# Patient Record
Sex: FEMALE | Race: WHITE | Employment: FULL TIME | URBAN - METROPOLITAN AREA
[De-identification: names, ages, dates, MRNs, and addresses within clinical notes are randomized per-mention and may not be internally consistent; named-entity substitution may affect disease eponyms.]

---

## 2020-10-21 ENCOUNTER — OFFICE VISIT (OUTPATIENT)
Dept: INTERNAL MEDICINE CLINIC | Age: 25
End: 2020-10-21
Payer: COMMERCIAL

## 2020-10-21 VITALS
SYSTOLIC BLOOD PRESSURE: 110 MMHG | BODY MASS INDEX: 18.22 KG/M2 | OXYGEN SATURATION: 99 % | TEMPERATURE: 97.8 F | HEART RATE: 68 BPM | HEIGHT: 69 IN | RESPIRATION RATE: 16 BRPM | DIASTOLIC BLOOD PRESSURE: 78 MMHG | WEIGHT: 123 LBS

## 2020-10-21 DIAGNOSIS — R41.840 CONCENTRATION DEFICIT: Primary | ICD-10-CM

## 2020-10-21 DIAGNOSIS — Z23 NEED FOR HPV VACCINATION: ICD-10-CM

## 2020-10-21 PROCEDURE — 99202 OFFICE O/P NEW SF 15 MIN: CPT | Performed by: NURSE PRACTITIONER

## 2020-10-21 RX ORDER — HUMAN PAPILLOMAVIRUS 9-VALENT VACCINE, RECOMBINANT 30; 40; 60; 40; 20; 20; 20; 20; 20 UG/.5ML; UG/.5ML; UG/.5ML; UG/.5ML; UG/.5ML; UG/.5ML; UG/.5ML; UG/.5ML; UG/.5ML
0.5 INJECTION, SUSPENSION INTRAMUSCULAR ONCE
Qty: 0.5 ML | Refills: 0 | Status: SHIPPED | OUTPATIENT
Start: 2020-10-21 | End: 2020-10-21

## 2020-10-21 RX ORDER — BUPROPION HYDROCHLORIDE 150 MG/1
150 TABLET ORAL
Qty: 30 TAB | Refills: 2 | Status: SHIPPED | OUTPATIENT
Start: 2020-10-21 | End: 2020-12-15

## 2020-12-15 ENCOUNTER — VIRTUAL VISIT (OUTPATIENT)
Dept: INTERNAL MEDICINE CLINIC | Age: 25
End: 2020-12-15
Payer: COMMERCIAL

## 2020-12-15 DIAGNOSIS — F90.9 ATTENTION DEFICIT HYPERACTIVITY DISORDER (ADHD), UNSPECIFIED ADHD TYPE: Primary | ICD-10-CM

## 2020-12-15 PROCEDURE — 99213 OFFICE O/P EST LOW 20 MIN: CPT | Performed by: NURSE PRACTITIONER

## 2020-12-15 RX ORDER — DEXTROAMPHETAMINE SACCHARATE, AMPHETAMINE ASPARTATE, DEXTROAMPHETAMINE SULFATE AND AMPHETAMINE SULFATE 2.5; 2.5; 2.5; 2.5 MG/1; MG/1; MG/1; MG/1
10 TABLET ORAL DAILY
Qty: 30 TAB | Refills: 0 | Status: SHIPPED | OUTPATIENT
Start: 2020-12-15 | End: 2021-03-17 | Stop reason: ALTCHOICE

## 2020-12-15 NOTE — PATIENT INSTRUCTIONS
Try 1 tab tomorrow and see how you feel. You can take 2 tabs if you don't feel like it is working. You can also try 1/2-1tab in the afternoons if needed. Learning About Stimulant Medicines for Attention Deficit Hyperactivity Disorder (ADHD) How are stimulant medicines used to treat ADHD? Stimulant medicines affect certain chemicals in the brain. They can help a person with ADHD to focus better. And they can make the person less hyperactive and impulsive. ADHD is treated with medicines and behavior therapy. Stimulants are the medicines used most. 
What are some types of these medicines? Stimulant medicines may include: · Dexmethylphenidate (Focalin). · Lisdexamfetamine (Vyvanse). · Methylphenidate (Concerta, Daytrana, Metadate CD, Methylin, Ritalin). · Mixed salts amphetamine (Adderall). How do you take them safely? · Take your medicines exactly as prescribed. Call your doctor if you think you are having a problem with your medicine. You will get more details on the specific medicines your doctor prescribes. · Do not take \"make-up\" doses. If you miss a dose, and if it's not too late in the day, it's okay to take it. But don't double up doses. · Do not misuse your medicine. Some medicines for ADHD can be misused. Some people may take a larger dose than prescribed. They may take them for their non-medical effects. Or they may share or sell them. Misuse can lead to a stimulant use disorder. · Keep close track of your medicine. Don't sell or give medicine to other people. What are the side effects? Common side effects include loss of appetite, a headache, and an upset stomach. You may also have mood changes or sleep problems. Or you may feel nervous. Some stimulant medicines can cause a dry mouth. If these medicines have bothersome side effects or don't work for you, your doctor might prescribe another type of medicine. How long can you expect to use these medicines? Most doctors prescribe a low dose of stimulant medicines at first. Your doctor may have you slowly increase the dose until your symptoms are managed. Or you might get a different medicine or treatment. This can take several weeks. Some doctors may advise taking a break from the medicine over some weekends or during holidays. But this depends on the type of symptoms you have. You may need to take medicine for ADHD for a long time. But your doctor will check now and then to see if you can take a lower dose and still get the benefits of the medicine. If you want to stop or reduce your use of the medicine, talk to your doctor first. Some signs that you may be able to lower or stop your dose include: 
· You have no symptoms for more than a year while you take the medicine. · You are doing better at the same dose. · Your behavior is appropriate even if you miss a dose or two. · You are newly able to concentrate. Follow-up care is a key part of your treatment and safety. Be sure to make and go to all appointments, and call your doctor if you are having problems. It's also a good idea to know your test results and keep a list of the medicines you take. Where can you learn more? Go to http://www.gray.com/ Enter S155 in the search box to learn more about \"Learning About Stimulant Medicines for Attention Deficit Hyperactivity Disorder (ADHD). \" Current as of: January 31, 2020               Content Version: 12.6 © 2120-1882 [employer]. Care instructions adapted under license by [employer] (which disclaims liability or warranty for this information). If you have questions about a medical condition or this instruction, always ask your healthcare professional. [address] any warranty or liability for your use of this information.

## 2020-12-15 NOTE — PROGRESS NOTES
Nick Zazueta is a 26 y.o. female who was seen by synchronous (real-time) audio-video technology on 12/15/2020. Assessment & Plan:   Diagnoses and all orders for this visit:    1. Attention deficit hyperactivity disorder (ADHD), unspecified ADHD type  -     dextroamphetamine-amphetamine (ADDERALL) 10 mg tablet; Take 1 Tab by mouth daily. Max Daily Amount: 10 mg. Follow-up and Dispositions    · Return in about 4 weeks (around 1/12/2021) for ADHD. Subjective:   Nick Zazueta was seen for Follow-up      Mental Health Review  Patient was recently diagnosed with ADHD by Ad[facility]. Records received and reviewed. Discussed treatment and different medications.  reviewed. She never tried the wellbutrin, will take this off her medication list.        Prior to Admission medications    Medication Sig Start Date End Date Taking? Authorizing Provider   INTRAUTERINE DEVICE, IUD, IU by IntraUTERine route. Provider, Historical   buPROPion XL (WELLBUTRIN XL) 150 mg tablet Take 1 Tab by mouth every morning. 10/21/20   Skiff, Emmer Byars, NP       There are no active problems to display for this patient. Current Outpatient Medications   Medication Sig Dispense Refill    dextroamphetamine-amphetamine (ADDERALL) 10 mg tablet Take 1 Tab by mouth daily. Max Daily Amount: 10 mg. 30 Tab 0    INTRAUTERINE DEVICE, IUD, IU by IntraUTERine route.  buPROPion XL (WELLBUTRIN XL) 150 mg tablet Take 1 Tab by mouth every morning. 30 Tab 2     Allergies   Allergen Reactions    Codeine Nausea Only     No past medical history on file. ROS - per HPI      Objective:   No flowsheet data found.   General: alert, cooperative, no distress   Mental  status: normal mood, behavior, speech, dress, motor activity, and thought processes, able to follow commands   Eyes: EOM intact, normal sclera   Mouth: mucous membranes moist   Neck: no visualized mass   Resp: normal effort and no respiratory distress   Neuro: no gross deficits   Musculoskeletal: normal ROM of neck   Skin: no discoloration or lesions of concern on visible areas   Psychiatric: normal affect, no hallucinations             We discussed the expected course, resolution and complications of the diagnosis(es) in detail. Medication risks, benefits, costs, interactions, and alternatives were discussed as indicated. I advised her to contact the office if her condition worsens, changes or fails to improve as anticipated. She expressed understanding with the diagnosis(es) and plan. Jackelin Dean is a 26 y.o. female who was evaluated by a video visit encounter for concerns as above. Patient identification was verified prior to start of the visit. A caregiver was present when appropriate. Due to this being a TeleHealth encounter (During DNFNE-46 public health emergency), evaluation of the following organ systems was limited: Vitals/Constitutional/EENT/Resp/CV/GI//MS/Neuro/Skin/Heme-Lymph-Imm. Pursuant to the emergency declaration under the [facility], 1135 waiver authority and the [employer] Act, this Virtual  Visit was conducted, with patient's (and/or legal guardian's) consent, to reduce the patient's risk of exposure to COVID-19 and provide necessary medical care. Services were provided through a synchronous discussion virtually to substitute for in-person clinic visit. I was at home. The patient was at home.     Candy Dos Santos NP

## 2021-02-26 ENCOUNTER — OFFICE VISIT (OUTPATIENT)
Dept: INTERNAL MEDICINE CLINIC | Age: 26
End: 2021-02-26
Payer: COMMERCIAL

## 2021-02-26 ENCOUNTER — TELEPHONE (OUTPATIENT)
Dept: INTERNAL MEDICINE CLINIC | Age: 26
End: 2021-02-26

## 2021-02-26 VITALS
RESPIRATION RATE: 16 BRPM | SYSTOLIC BLOOD PRESSURE: 115 MMHG | DIASTOLIC BLOOD PRESSURE: 77 MMHG | TEMPERATURE: 98.2 F | BODY MASS INDEX: 19.21 KG/M2 | WEIGHT: 131 LBS | HEART RATE: 91 BPM | OXYGEN SATURATION: 98 %

## 2021-02-26 DIAGNOSIS — M25.562 ACUTE PAIN OF LEFT KNEE: Primary | ICD-10-CM

## 2021-02-26 DIAGNOSIS — S93.492A SPRAIN OF ANTERIOR TALOFIBULAR LIGAMENT OF LEFT ANKLE, INITIAL ENCOUNTER: ICD-10-CM

## 2021-02-26 PROCEDURE — 99212 OFFICE O/P EST SF 10 MIN: CPT | Performed by: NURSE PRACTITIONER

## 2021-02-26 NOTE — PROGRESS NOTES
Verified name and birth date for privacy precautions. Chart reviewed in preparation for today's visit. Chief Complaint   Patient presents with    Knee Injury     left ankle and left knee injury yesterday          Health Maintenance Due   Topic    Hepatitis C Screening     HPV Age 9Y-34Y (1 - 2-dose series)    DTaP/Tdap/Td series (1 - Tdap)    PAP AKA CERVICAL CYTOLOGY     Flu Vaccine (1)         Wt Readings from Last 3 Encounters:   02/26/21 131 lb (59.4 kg)   10/21/20 123 lb (55.8 kg)     Temp Readings from Last 3 Encounters:   02/26/21 98.2 °F (36.8 °C) (Temporal)   10/21/20 97.8 °F (36.6 °C) (Temporal)     BP Readings from Last 3 Encounters:   02/26/21 115/77   10/21/20 110/78     Pulse Readings from Last 3 Encounters:   02/26/21 91   10/21/20 68         Learning Assessment:  :     No flowsheet data found. Depression Screening:  :     3 most recent PHQ Screens 2/26/2021   Little interest or pleasure in doing things Not at all   Feeling down, depressed, irritable, or hopeless Not at all   Total Score PHQ 2 0       Fall Risk Assessment:  :     No flowsheet data found. Abuse Screening:  :     Abuse Screening Questionnaire 2/26/2021   Do you ever feel afraid of your partner? N   Are you in a relationship with someone who physically or mentally threatens you? N   Is it safe for you to go home?  Marialuisa Godwin

## 2021-02-26 NOTE — PROGRESS NOTES
HISTORY OF PRESENT ILLNESS  Shayan Chao is a 26 y.o. female. Patient reports left knee and ankle pain after being hit by a running dog yesterday. She was walking with her dog at the dog park when a border collie ran into her left leg on lateral aspect. Her knee and ankle twisted inward. She experienced mild pain last night making it uncomfortable to sleep. Today, she is experiencing pain with sudden twisting movements of her knee and pain when walking of left ankle. There is swelling of left ankle. She has not taken anything for the pain. She has applied an ace wrap, which has helped slightly. Visit Vitals  /77 (BP 1 Location: Left arm, BP Patient Position: Sitting, BP Cuff Size: Adult)   Pulse 91   Temp 98.2 °F (36.8 °C) (Temporal)   Resp 16   Wt 131 lb (59.4 kg)   LMP 02/17/2021   SpO2 98%   BMI 19.21 kg/m²       HPI    Review of Systems   Musculoskeletal:        Left knee and ankle pain       Physical Exam  Constitutional:       Appearance: Normal appearance. Musculoskeletal:      Left knee: She exhibits normal range of motion, no swelling, no effusion, no ecchymosis and normal patellar mobility. No medial joint line, no lateral joint line, no MCL, no LCL and no patellar tendon tenderness noted. Left ankle: She exhibits swelling. She exhibits normal range of motion. Tenderness. AITFL (and swelling) tenderness found. Neurological:      Mental Status: She is alert. ASSESSMENT and PLAN    ICD-10-CM ICD-9-CM    1. Acute pain of left knee  M25.562 719.46    2. Sprain of anterior talofibular ligament of left ankle, initial encounter  S93.492A 845.09      No orders of the defined types were placed in this encounter.   RICE advised over the next 2-4 weeks  Motrin or aleve for pain and swelling  Consider imaging if no improvement over the next week

## 2021-03-09 DIAGNOSIS — F90.9 ATTENTION DEFICIT HYPERACTIVITY DISORDER (ADHD), UNSPECIFIED ADHD TYPE: ICD-10-CM

## 2021-03-09 RX ORDER — DEXTROAMPHETAMINE SACCHARATE, AMPHETAMINE ASPARTATE, DEXTROAMPHETAMINE SULFATE AND AMPHETAMINE SULFATE 2.5; 2.5; 2.5; 2.5 MG/1; MG/1; MG/1; MG/1
10 TABLET ORAL DAILY
Qty: 30 TAB | Refills: 0 | OUTPATIENT
Start: 2021-03-09

## 2021-03-10 NOTE — TELEPHONE ENCOUNTER
Called patient and scheduled an appointment on Wednesday, 3/17/2021 at 1:15 PM with [facility]

## 2021-03-17 ENCOUNTER — OFFICE VISIT (OUTPATIENT)
Dept: INTERNAL MEDICINE CLINIC | Age: 26
End: 2021-03-17
Payer: COMMERCIAL

## 2021-03-17 VITALS
DIASTOLIC BLOOD PRESSURE: 68 MMHG | BODY MASS INDEX: 19.2 KG/M2 | WEIGHT: 129.6 LBS | RESPIRATION RATE: 16 BRPM | TEMPERATURE: 97.5 F | OXYGEN SATURATION: 99 % | HEIGHT: 69 IN | SYSTOLIC BLOOD PRESSURE: 90 MMHG | HEART RATE: 60 BPM

## 2021-03-17 DIAGNOSIS — F90.9 ATTENTION DEFICIT HYPERACTIVITY DISORDER (ADHD), UNSPECIFIED ADHD TYPE: Primary | ICD-10-CM

## 2021-03-17 PROCEDURE — 99213 OFFICE O/P EST LOW 20 MIN: CPT | Performed by: NURSE PRACTITIONER

## 2021-03-17 RX ORDER — DEXTROAMPHETAMINE SACCHARATE, AMPHETAMINE ASPARTATE MONOHYDRATE, DEXTROAMPHETAMINE SULFATE AND AMPHETAMINE SULFATE 2.5; 2.5; 2.5; 2.5 MG/1; MG/1; MG/1; MG/1
10 CAPSULE, EXTENDED RELEASE ORAL
Qty: 30 CAP | Refills: 0 | Status: SHIPPED | OUTPATIENT
Start: 2021-03-17 | End: 2021-05-12 | Stop reason: SDUPTHER

## 2021-03-17 NOTE — PROGRESS NOTES
Subjective:      Shante Wilson is a 26 y.o. female who presents today for follow up. Mental Health Review  Patient is seen for ADHD. Current treatment regimen includes: Adderall 10mg daily . Medication compliance: she does not take this when not working. Pt reports the following side effects: sensation of feeling \"on\" medication. Discussed starting XR to help with this. [facility]  reviewed. Patient Active Problem List    Diagnosis Date Noted    Attention deficit hyperactivity disorder (ADHD), unspecified ADHD type 03/17/2021     Current Outpatient Medications   Medication Sig Dispense Refill    amphetamine-dextroamphetamine XR (ADDERALL XR) 10 mg XR capsule Take 1 Cap by mouth every morning. Max Daily Amount: 10 mg. 30 Cap 0    INTRAUTERINE DEVICE, IUD, IU by IntraUTERine route. Allergies   Allergen Reactions    Codeine Nausea Only     History reviewed. No pertinent past medical history. Review of Systems    A comprehensive review of systems was negative except for that written in the HPI. Objective:     Visit Vitals  BP 90/68 (BP 1 Location: Right arm, BP Patient Position: Sitting, BP Cuff Size: Adult)   Pulse 60   Temp 97.5 °F (36.4 °C) (Temporal)   Resp 16   Ht 5' 9.25\" (1.759 m)   Wt 129 lb 9.6 oz (58.8 kg)   LMP 03/15/2021 (Exact Date)   SpO2 99%   BMI 19.00 kg/m²     General appearance: alert, cooperative, no distress, appears stated age  Head: Normocephalic, without obvious abnormality, atraumatic  Eyes: negative  Lungs: clear to auscultation bilaterally  Heart: regular rate and rhythm, S1, S2 normal, no murmur, click, rub or gallop  Neurologic: Grossly normal  Psych: appropriate mood, speech, affect    Nursing note and vitals reviewed  Assessment/Plan:        1. Attention deficit hyperactivity disorder (ADHD), unspecified ADHD type  - will trial XR 10mg  - amphetamine-dextroamphetamine XR (ADDERALL XR) 10 mg XR capsule; Take 1 Cap by mouth every morning. Max Daily Amount: 10 mg. Dispense: 30 Cap; Refill: 0    Follow-up and Dispositions    · Return for Message me in a few weeks about effectiveness. If stable dosage follow up in 3 months. Advised her to call back or return to office if symptoms worsen/change/persist.  Discussed expected course/resolution/complications of diagnosis in detail with patient. Medication risks/benefits/costs/interactions/alternatives discussed with patient. She was given an after visit summary which includes diagnoses, current medications, & vitals. She expressed understanding with the diagnosis and plan.

## 2021-05-12 DIAGNOSIS — F90.9 ATTENTION DEFICIT HYPERACTIVITY DISORDER (ADHD), UNSPECIFIED ADHD TYPE: ICD-10-CM

## 2021-05-12 RX ORDER — DEXTROAMPHETAMINE SACCHARATE, AMPHETAMINE ASPARTATE MONOHYDRATE, DEXTROAMPHETAMINE SULFATE AND AMPHETAMINE SULFATE 2.5; 2.5; 2.5; 2.5 MG/1; MG/1; MG/1; MG/1
10 CAPSULE, EXTENDED RELEASE ORAL
Qty: 30 CAP | Refills: 0 | Status: SHIPPED | OUTPATIENT
Start: 2021-05-12 | End: 2021-06-20 | Stop reason: SDUPTHER

## 2021-05-12 RX ORDER — DEXTROAMPHETAMINE SACCHARATE, AMPHETAMINE ASPARTATE MONOHYDRATE, DEXTROAMPHETAMINE SULFATE AND AMPHETAMINE SULFATE 2.5; 2.5; 2.5; 2.5 MG/1; MG/1; MG/1; MG/1
10 CAPSULE, EXTENDED RELEASE ORAL
Qty: 30 CAP | Refills: 0 | Status: SHIPPED | OUTPATIENT
Start: 2021-06-11 | End: 2021-06-21 | Stop reason: SDUPTHER

## 2021-06-20 DIAGNOSIS — F90.9 ATTENTION DEFICIT HYPERACTIVITY DISORDER (ADHD), UNSPECIFIED ADHD TYPE: ICD-10-CM

## 2021-06-21 RX ORDER — DEXTROAMPHETAMINE SACCHARATE, AMPHETAMINE ASPARTATE MONOHYDRATE, DEXTROAMPHETAMINE SULFATE AND AMPHETAMINE SULFATE 2.5; 2.5; 2.5; 2.5 MG/1; MG/1; MG/1; MG/1
10 CAPSULE, EXTENDED RELEASE ORAL
Qty: 30 CAPSULE | Refills: 0 | Status: SHIPPED | OUTPATIENT
Start: 2021-06-21

## 2021-08-11 ENCOUNTER — TELEPHONE (OUTPATIENT)
Dept: INTERNAL MEDICINE CLINIC | Age: 26
End: 2021-08-11

## 2021-08-11 NOTE — TELEPHONE ENCOUNTER
Patient's new PCP will prescribe ADD meds even with testing documentation, pt was advised to find a psychiatrist in [place] that prescribe this for her.

## 2021-08-11 NOTE — TELEPHONE ENCOUNTER
Darci Esperanza (Self) [phone] (H)     PT moved to [place], [place] and cannot find someone to continue Adderall prescription. She was told by her therapist to call us and see if we could recommend/help find someone to continue her care.      Would like a call back either way to let her know if we can help or not

## 2022-03-19 PROBLEM — F90.9 ATTENTION DEFICIT HYPERACTIVITY DISORDER (ADHD), UNSPECIFIED ADHD TYPE: Status: ACTIVE | Noted: 2021-03-17

## 2023-05-15 RX ORDER — DEXTROAMPHETAMINE SACCHARATE, AMPHETAMINE ASPARTATE MONOHYDRATE, DEXTROAMPHETAMINE SULFATE AND AMPHETAMINE SULFATE 2.5; 2.5; 2.5; 2.5 MG/1; MG/1; MG/1; MG/1
10 CAPSULE, EXTENDED RELEASE ORAL
COMMUNITY
Start: 2021-06-21

## 2023-12-04 NOTE — PROGRESS NOTES
Patient this am with c/o chest pain 5 out of 10. Patient states it feels like tightness. Bp 168/72 hr 64. EKG done and Dr Vasquez called . Labs ordered stat and tylenol given and patient states pain is gone.   Subjective:      Lavinia Santo is a 26 y.o. female who is a new patient and is here to establish care. The following sections were reviewed & updated as appropriate: PMH, PL, PSH, FH, RxH, and SH. She is seen at [facility]. Concentration deficit: she notes in the last 2 months she is having trouble completing tasks, focusing on her job. She works as a  and this is affecting her work. She states she does not feel anxious or depressed. Her current focus symptoms are noted daily. She has a history of trouble focusing in school and not finishing projects. She has not been tested for ADHD. There are no active problems to display for this patient. Current Outpatient Medications   Medication Sig Dispense Refill    INTRAUTERINE DEVICE, IUD, IU by IntraUTERine route. Allergies   Allergen Reactions    Codeine Nausea Only     No past medical history on file. No past surgical history on file. Review of Systems    A comprehensive review of systems was negative except for that written in the HPI. Objective:     Visit Vitals  /78 (BP 1 Location: Left arm, BP Patient Position: Sitting)   Pulse 68   Temp 97.8 °F (36.6 °C) (Temporal)   Resp 16   Ht 5' 9.25\" (1.759 m)   Wt 123 lb (55.8 kg)   SpO2 99%   BMI 18.03 kg/m²     General appearance: alert, cooperative, no distress, appears stated age  Head: Normocephalic, without obvious abnormality, atraumatic  Eyes: negative  Lungs: clear to auscultation bilaterally  Heart: regular rate and rhythm, S1, S2 normal, no murmur, click, rub or gallop  Neurologic: Grossly normal  Psych: appropriate mood, speech, affect    Nursing note and vitals reviewed  Assessment/Plan:     1. Concentration deficit  - REFERRAL TO NEUROPSYCHOLOGY  - buPROPion XL (WELLBUTRIN XL) 150 mg tablet; Take 1 Tab by mouth every morning. Dispense: 30 Tab; Refill: 2    2.  Need for HPV vaccination  - HUMAN PAPILLOMA VIRUS (HPV) VACCINE, TYPES 6, 11, 16, 18 (QUADRIVALENT), 3 DOSE SCHED., IM       Follow-up and Dispositions    · Return for Make a follow up apt with me after seeing Scotty Booker . Advised her to call back or return to office if symptoms worsen/change/persist.  Discussed expected course/resolution/complications of diagnosis in detail with patient. Medication risks/benefits/costs/interactions/alternatives discussed with patient. She was given an after visit summary which includes diagnoses, current medications, & vitals. She expressed understanding with the diagnosis and plan.

## 2025-04-25 NOTE — PATIENT INSTRUCTIONS
Bupropion (By mouth) Bupropion ([id number]) Treats depression and aids in quitting smoking. Also prevents depression caused by seasonal affective disorder (SAD). Brand Name(s): Aplenzin, Forfivo XL, Wellbutrin, Wellbutrin SR, Wellbutrin XL, Zyban There may be other brand names for this medicine. When This Medicine Should Not Be Used: This medicine is not right for everyone. Do not use it if you had an allergic reaction to bupropion, or if you have seizures, anorexia, or bulimia. How to Use This Medicine:  
Tablet, Long Acting Tablet · Take your medicine as directed. Your dose may need to be changed several times to find what works best for you. · You may need to take Wellbutrin® for up to 4 weeks before you feel better. You may need to take Zyban® for 1 to 2 weeks before the date that you plan to stop smoking. · Swallow the extended-release tablet whole. Do not crush, break, or chew it. · It is best to take Aplenzin® in the morning. · Do not take Wellbutrin® or Zyban® close to bedtime if you have trouble sleeping. · Take it with food if it upsets your stomach or if you have nausea. · If you take the extended-release tablet, part of the tablet may pass into your stools. This is normal and is nothing to worry about. · This medicine should come with a Medication Guide. Ask your pharmacist for a copy if you do not have one. · Missed dose: Skip the missed dose and go back to your regular dosing schedule. Never take extra medicine to make up for a missed dose. · Store the medicine in a closed container at room temperature, away from heat, moisture, and direct light. Drugs and Foods to Avoid: Ask your doctor or pharmacist before using any other medicine, including over-the-counter medicines, vitamins, and herbal products. · Do not use this medicine and an MAO inhibitor (MAOI) within 14 days of each other.  Do not use Zyban® to quit smoking if you already take Paula Lee arrives via triage from home with mom who gives consent to treat. Reports sore throat since last night. Denies any associated symptoms, no congestion, fevers, chills, N/V/D, or coughing   Aplenzin® or Wellbutrin® for depression, because they are the same medicine. · Tell your doctor if you take barbiturates, benzodiazepines, antiseizure medicine, or sedatives, or if you recently stopped taking them. · Some medicines can affect how bupropion works. Tell your doctor if you use any of the following: ¨ Amantadine, carbamazepine, cimetidine, clopidogrel, cyclophosphamide, digoxin, efavirenz, levodopa, lopinavir, nelfinavir, nicotine patch, orphenadrine, phenobarbital, phenytoin, ritonavir, tamoxifen, theophylline, thiotepa, ticlopidine ¨ Beta blocker medicine (including metoprolol) ¨ A blood thinner (including warfarin) ¨ Insulin or diabetes medicine ¨ Medicine to treat depression (including desipramine, fluoxetine, imipramine, nortriptyline, paroxetine, sertraline, venlafaxine) ¨ Medicine to treat heart rhythm problems (including flecainide, propafenone) ¨ Medicine to treat mental illness (including haloperidol, risperidone, thioridazine) ¨ Steroid medicine (including dexamethasone, hydrocortisone, methylprednisolone, prednisolone, prednisone) · Do not drink alcohol while you are using this medicine. · Tell your doctor if you use anything else that makes you sleepy. Some examples are allergy medicine, narcotic pain medicine, and alcohol. Warnings While Using This Medicine: · Tell your doctor if you are pregnant or breastfeeding, or if you have kidney disease, liver disease, heart disease, diabetes, glaucoma, mental illness (including bipolar disorder), or high blood pressure. Tell your doctor if you have a history of drug addiction or if you drink alcohol. · For some children, teenagers, and young adults, this medicine may increase mental or emotional problems. This may lead to thoughts of suicide and violence. Talk with your doctor right away if you have any thoughts or behavior changes that concern you.  Tell your doctor if you or anyone in your family has a history of bipolar disorder or suicide attempts. · This medicine may cause the following problems: 
¨ Increased risk of seizures ¨ Changes in mood or behavior ¨ High blood pressure ¨ Serious skin reactions · This medicine may make you dizzy or drowsy. Do not drive or do anything that could be dangerous until you know how this medicine affects you. · Zyban® is only part of a complete program to help you quit smoking. You may still want to smoke at times. Have a plan to cope with these situations. · Do not stop using this medicine suddenly. Your doctor will need to slowly decrease your dose before you stop it completely. · Tell any doctor or dentist who treats you that you are using this medicine. This medicine may affect certain medical test results. · Your doctor will check your progress and the effects of this medicine at regular visits. Keep all appointments. · Keep all medicine out of the reach of children. Never share your medicine with anyone. Possible Side Effects While Using This Medicine:  
Call your doctor right away if you notice any of these side effects: · Allergic reaction: Itching or hives, swelling in your face or hands, swelling or tingling in your mouth or throat, chest tightness, trouble breathing · Blistering, peeling, red skin rash · Chest pain, trouble breathing, fast, slow, or uneven heartbeat · Eye pain, vision changes, seeing halos around lights · Muscle or joint pain, fever with rash · Seeing or hearing things that are not there, feeling like people are against you · Seizures · Sudden increase in energy, racing thoughts, trouble sleeping · Thoughts of hurting yourself, worsening depression, severe agitation or confusion If you notice these less serious side effects, talk with your doctor: · Dry mouth 
· Headache, dizziness · Nausea, vomiting, constipation, diarrhea, gas, stomach pain · Weight gain or loss If you notice other side effects that you think are caused by this medicine, tell your doctor. Call your doctor for medical advice about side effects. You may report side effects to FDA at 0-473-PSS-8740 © 2017 [address] [employer] Information is for End User's use only and may not be sold, redistributed or otherwise used for commercial purposes. The above information is an  only. It is not intended as medical advice for individual conditions or treatments. Talk to your doctor, nurse or pharmacist before following any medical regimen to see if it is safe and effective for you.